# Patient Record
Sex: FEMALE | Race: WHITE | NOT HISPANIC OR LATINO | Employment: FULL TIME | URBAN - METROPOLITAN AREA
[De-identification: names, ages, dates, MRNs, and addresses within clinical notes are randomized per-mention and may not be internally consistent; named-entity substitution may affect disease eponyms.]

---

## 2021-04-27 PROBLEM — R93.41 ABNORMAL ULTRASOUND OF BLADDER: Status: ACTIVE | Noted: 2021-04-27

## 2021-06-08 PROBLEM — N39.3 SUI (STRESS URINARY INCONTINENCE, FEMALE): Status: ACTIVE | Noted: 2021-06-08

## 2021-06-10 PROBLEM — K90.0 CELIAC DISEASE: Status: ACTIVE | Noted: 2021-05-20

## 2021-06-10 PROBLEM — E11.9 TYPE 2 DIABETES MELLITUS: Status: ACTIVE | Noted: 2019-05-31

## 2021-06-10 PROBLEM — I10 ESSENTIAL HYPERTENSION: Status: ACTIVE | Noted: 2018-11-15

## 2021-06-10 PROBLEM — M06.9 RHEUMATOID ARTHRITIS: Status: ACTIVE | Noted: 2018-11-15

## 2021-06-10 PROBLEM — R53.83 FATIGUE: Status: ACTIVE | Noted: 2019-03-29

## 2021-06-10 PROBLEM — M92.61 HAGLUND'S DEFORMITY OF RIGHT HEEL: Status: ACTIVE | Noted: 2021-06-10

## 2021-06-10 PROBLEM — K21.9 GASTROESOPHAGEAL REFLUX DISEASE WITHOUT ESOPHAGITIS: Status: ACTIVE | Noted: 2019-05-31

## 2021-06-10 PROBLEM — M77.31 CALCANEAL SPUR, RIGHT FOOT: Status: ACTIVE | Noted: 2021-06-10

## 2021-09-29 PROBLEM — E55.9 VITAMIN D DEFICIENCY: Status: ACTIVE | Noted: 2019-12-31

## 2021-09-29 PROBLEM — M54.12 CERVICAL RADICULOPATHY: Status: ACTIVE | Noted: 2021-08-30

## 2021-09-29 PROBLEM — I83.90 VARICOSE VEINS OF LOWER EXTREMITY: Status: ACTIVE | Noted: 2021-06-15

## 2021-09-29 PROBLEM — R20.2 PARESTHESIAS IN RIGHT HAND: Status: ACTIVE | Noted: 2021-09-29

## 2022-02-15 PROBLEM — G56.03 BILATERAL CARPAL TUNNEL SYNDROME: Status: ACTIVE | Noted: 2022-02-15

## 2022-02-15 PROBLEM — E61.1 IRON DEFICIENCY: Status: ACTIVE | Noted: 2022-02-07

## 2022-02-15 PROBLEM — M19.90 OSTEOARTHRITIS: Status: ACTIVE | Noted: 2022-02-07

## 2022-02-15 PROBLEM — E78.5 HYPERLIPIDEMIA: Status: ACTIVE | Noted: 2022-02-07

## 2022-03-15 PROBLEM — R93.41 ABNORMAL ULTRASOUND OF BLADDER: Status: RESOLVED | Noted: 2021-04-27 | Resolved: 2022-03-15

## 2022-03-15 PROBLEM — E66.01 OBESITY, CLASS III, BMI 40-49.9 (MORBID OBESITY): Status: ACTIVE | Noted: 2022-03-15

## 2022-03-15 PROBLEM — M19.90 OSTEOARTHRITIS: Status: RESOLVED | Noted: 2022-02-07 | Resolved: 2022-03-15

## 2022-03-15 PROBLEM — N39.3 SUI (STRESS URINARY INCONTINENCE, FEMALE): Status: RESOLVED | Noted: 2021-06-08 | Resolved: 2022-03-15

## 2022-03-15 PROBLEM — M79.641 HAND PAIN, RIGHT: Status: ACTIVE | Noted: 2022-03-15

## 2022-03-15 PROBLEM — I83.90 VARICOSE VEINS OF LOWER EXTREMITY: Status: RESOLVED | Noted: 2021-06-15 | Resolved: 2022-03-15

## 2022-03-15 PROBLEM — Z98.890 STATUS POST SURGERY: Chronic | Status: ACTIVE | Noted: 2022-03-15

## 2022-03-15 PROBLEM — M54.12 CERVICAL RADICULOPATHY: Status: RESOLVED | Noted: 2021-08-30 | Resolved: 2022-03-15

## 2022-03-15 PROBLEM — E61.1 IRON DEFICIENCY: Status: RESOLVED | Noted: 2022-02-07 | Resolved: 2022-03-15

## 2022-03-15 PROBLEM — M77.31 CALCANEAL SPUR, RIGHT FOOT: Status: RESOLVED | Noted: 2021-06-10 | Resolved: 2022-03-15

## 2022-03-15 PROBLEM — E66.813 OBESITY, CLASS III, BMI 40-49.9 (MORBID OBESITY): Status: ACTIVE | Noted: 2022-03-15

## 2022-03-15 PROBLEM — G56.01 CARPAL TUNNEL SYNDROME ON RIGHT: Status: ACTIVE | Noted: 2022-03-15

## 2022-04-28 PROBLEM — G56.02 CARPAL TUNNEL SYNDROME OF LEFT WRIST: Status: ACTIVE | Noted: 2022-02-15

## 2022-06-22 PROBLEM — Z98.890 STATUS POST SURGERY: Chronic | Status: RESOLVED | Noted: 2022-03-15 | Resolved: 2022-06-22

## 2022-06-22 PROBLEM — Z98.890 STATUS POST SURGERY: Status: ACTIVE | Noted: 2022-06-22

## 2022-06-22 PROBLEM — M79.641 HAND PAIN, RIGHT: Status: RESOLVED | Noted: 2022-03-15 | Resolved: 2022-06-22

## 2022-06-22 PROBLEM — G56.01 CARPAL TUNNEL SYNDROME ON RIGHT: Status: RESOLVED | Noted: 2022-03-15 | Resolved: 2022-06-22

## 2022-06-22 PROBLEM — M79.642 HAND PAIN, LEFT: Status: ACTIVE | Noted: 2022-06-22

## 2022-06-22 PROBLEM — R20.2 PARESTHESIAS IN RIGHT HAND: Status: RESOLVED | Noted: 2021-09-29 | Resolved: 2022-06-22

## 2022-08-02 PROBLEM — M48.02 CERVICAL STENOSIS OF SPINAL CANAL: Status: ACTIVE | Noted: 2022-08-02

## 2022-08-02 PROBLEM — M54.2 NECK PAIN: Status: ACTIVE | Noted: 2022-08-02

## 2022-08-02 PROBLEM — Z98.890 HISTORY OF CARPAL TUNNEL SURGERY OF RIGHT WRIST: Status: ACTIVE | Noted: 2022-08-02

## 2022-08-02 PROBLEM — M47.812 CERVICAL SPONDYLOSIS WITHOUT MYELOPATHY: Status: ACTIVE | Noted: 2022-08-02

## 2022-08-02 PROBLEM — M48.02 NEUROFORAMINAL STENOSIS OF CERVICAL SPINE: Status: ACTIVE | Noted: 2022-08-02

## 2023-01-10 PROBLEM — Q64.4 URACHAL REMNANT: Status: ACTIVE | Noted: 2023-01-10

## 2023-01-10 PROBLEM — N32.81 OVERACTIVE BLADDER: Status: ACTIVE | Noted: 2023-01-10

## 2024-04-02 PROBLEM — N28.89 RENAL MASS: Status: ACTIVE | Noted: 2024-04-02

## 2024-10-01 PROBLEM — I26.99 PULMONARY EMBOLISM: Status: ACTIVE | Noted: 2024-10-01

## 2024-10-01 PROBLEM — C64.9 RENAL CELL CARCINOMA: Status: ACTIVE | Noted: 2024-10-01

## 2024-12-23 PROBLEM — Z79.01 ANTICOAGULATED: Status: ACTIVE | Noted: 2024-12-23

## 2024-12-23 PROBLEM — Z86.711 HISTORY OF PULMONARY EMBOLISM: Status: ACTIVE | Noted: 2024-10-01

## 2025-01-23 PROBLEM — R59.0 MEDIASTINAL ADENOPATHY: Status: ACTIVE | Noted: 2025-01-23

## 2025-04-18 PROBLEM — Z85.528 H/O RENAL CELL CARCINOMA: Status: ACTIVE | Noted: 2025-04-18

## 2025-06-16 PROBLEM — J98.59 MEDIASTINAL MASS: Status: ACTIVE | Noted: 2025-06-16

## 2025-07-24 PROBLEM — S22.31XK CLOSED FRACTURE OF ONE RIB OF RIGHT SIDE WITH NONUNION: Status: ACTIVE | Noted: 2025-07-24

## 2025-07-25 ENCOUNTER — PATIENT OUTREACH (OUTPATIENT)
Dept: ADMINISTRATIVE | Facility: CLINIC | Age: 45
End: 2025-07-25

## 2025-07-25 NOTE — PROGRESS NOTES
C3 nurse spoke with Angie Fish for a TCC post hospital discharge follow up call. The patient has a scheduled HOSFU appointment with [facility] 7/31/25 @2:30pm.

## 2025-07-25 NOTE — PROGRESS NOTES
C3 nurse attempted to contact Angie Fish for a TCC post hospital discharge follow up call. No answer, LVM . The patient has a scheduled HOSFU appointment with [facility] 7/31/25 @2:30pm.